# Patient Record
Sex: FEMALE | Race: WHITE
[De-identification: names, ages, dates, MRNs, and addresses within clinical notes are randomized per-mention and may not be internally consistent; named-entity substitution may affect disease eponyms.]

---

## 2017-02-01 ENCOUNTER — HOSPITAL ENCOUNTER (EMERGENCY)
Dept: HOSPITAL 80 - CED | Age: 22
Discharge: HOME | End: 2017-02-01
Payer: MEDICAID

## 2017-02-01 VITALS — RESPIRATION RATE: 18 BRPM | OXYGEN SATURATION: 97 %

## 2017-02-01 VITALS — TEMPERATURE: 98.6 F | DIASTOLIC BLOOD PRESSURE: 64 MMHG | SYSTOLIC BLOOD PRESSURE: 111 MMHG | HEART RATE: 81 BPM

## 2017-02-01 DIAGNOSIS — Z87.891: ICD-10-CM

## 2017-02-01 DIAGNOSIS — J02.9: Primary | ICD-10-CM

## 2017-02-01 NOTE — UCPHY
H & P


Time Seen by Provider: 02/01/17 14:41


Patient Type: Established


HPI/ROS: 


CHIEF COMPLAINT:  Sore throat





HISTORY OF PRESENT ILLNESS:  22-year-old female presents to Urgent Care by 

private vehicle complaining of sore throat.  Patient denies dysphagia.  She has 

had sore throat over last few days.  She does have a history of strep throat in 

the past.  Her younger sister was diagnosed with strep recently whom she is 

around a lot.  She denies nasal congestion, rhinorrhea or cough.  Denies rash.  

Denies abdominal pain, vomiting or diarrhea.





REVIEW OF SYSTEMS:


Constitutional:  No fever, no chills.


Eyes:  No double or blurry vision.


ENT: sore throat.


Respiratory:  No cough, no shortness of breath.


Cardiac:  No chest pain.


Gastrointestinal:  No abdominal pain, vomiting or diarrhea.


Genitourinary:  No dysuria.


Musculoskeletal:  No neck or back pain.


Skin:  No rashes.


Neurological:  No headache.


Past Medical/Surgical History: 


Negative


Social History: 


Single and lives in [place]


Smoking Status: Former smoker


Physical Exam: 


General Appearance:  Alert, no distress. Temperature 36.7.


Eyes:  Pupils equal and round.  Extraocular motions are all intact.


ENT:  Mouth:  Mucous membranes moist. Mild posterior pharyngeal injection.  No 

exudate.  No muffled voice or trismus.  No anterior cervical lymphadenopathy 

palpated.


Respiratory:  No wheezing, rhonchi, or rales, lungs are clear to auscultation.


Cardiovascular:  Regular rate and rhythm.


Gastrointestinal:  Abdomen is soft and nontender, no masses, no rebound or 

guarding, bowel sounds normal.


Neurological:  Alert and oriented x 3, cranial nerves II through XII grossly 

intact


Skin:  Warm and dry, no rashes.


Musculoskeletal:  Nontender to palpate along the cervical, thoracic or lumbar 

spine.  Neck is supple.


Extremities:  Full range of motion and no peripheral edema.


Psychiatric:  Patient is oriented X 3, there is no agitation.


Constitutional: 


 Initial Vital Signs











Temperature (C)  36.7 C   02/01/17 14:30


 


Heart Rate  87   02/01/17 14:30


 


Respiratory Rate  18   02/01/17 14:30


 


Blood Pressure  118/73   02/01/17 14:30


 


O2 Sat (%)  97   02/01/17 14:30








 











O2 Delivery Mode               Room Air














Allergies/Adverse Reactions: 


 





Penicillins Allergy (Verified 02/01/17 14:43)


 


succinylcholine Allergy (Verified 02/01/17 14:43)


 








Home Medications: 














 Medication  Instructions  Recorded


 


Hydrocodone/APAP 5/325 [Norco 1 - 2 tab PO Q4 PRN #30 tab 08/27/16





5/325 (*)]  


 


Ibuprofen [Motrin (*)] 600 mg PO Q6 PRN #60 tab 08/27/16


 


Iron Polysacch/Iron Heme Polyp 28 mg PO DAILY #30 tab 08/27/16





[Bifera]  














Medical Decision Making


ED Course/Re-evaluation: 


22-year-old female presents to urgent care with concerns of possible strep 

throat.  Strep swab is pending.





Rapid strep test was negative. The patient will call for the results of the 

throat culture in 48 hours.  She was encouraged to return if she had any other 

change in symptoms or if she felt worse in any way.


Differential Diagnosis: 


Including but not limited to strep pharyngitis, mononucleosis, viral syndrome, 

influenza, bronchitis, pneumonia, retropharyngeal abscess





- Data Points


Laboratory Results: 


 











  02/01/17 02/01/17





  Unknown 15:19


 


Group A Strep Screen    NEGATIVE 





    (NEGATIVE) 


 


Group A Strep DNA  Pending   





   














Departure





- Departure


Disposition: Home, Routine, Self-Care


Clinical Impression: 


Pharyngitis


Qualifiers:


 Pharyngitis/tonsillitis etiology: unspecified etiology Qualifier Code: (J02.9) 

Acute pharyngitis, unspecified


Condition: Good


Instructions:  Pharyngitis (ED)


Additional Instructions: 


Adult Pain & Fever Control:


We recommend Acetaminophen (Tylenol) and Ibuprofen (Motrin,Advil) for pain and 

fever control.  When fever is high or pain severe, both drugs can be used at 

the same time, but at different intervals.  Please note the time differences.  


Your dose is:     Acetaminophen 1000mg every 4 to 6 hours


        Ibuprofen 600mg every 8 hours with food  


         Note:  do not take Acetaminophen with Hydrocodone (Vicodin, Lortab) or 

Oycodone (Percocet).  These medications also contain Acetaminophen.  No more 

than 3000mg of Acetaminophen should be taken in 24 hours (for an adult).





Call [phone] for the results of your throat culture in 48 hours.


Referrals: 


[facility] [Outside] - As per Instructions





- PQRS


PQRS Measurement: 


Not applicable

## 2017-10-10 ENCOUNTER — HOSPITAL ENCOUNTER (EMERGENCY)
Dept: HOSPITAL 80 - CED | Age: 22
Discharge: HOME | End: 2017-10-10
Payer: MEDICAID

## 2017-10-10 VITALS
DIASTOLIC BLOOD PRESSURE: 99 MMHG | TEMPERATURE: 98.4 F | SYSTOLIC BLOOD PRESSURE: 136 MMHG | HEART RATE: 95 BPM | RESPIRATION RATE: 18 BRPM | OXYGEN SATURATION: 97 %
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
## 2017-10-10 DIAGNOSIS — J45.909: ICD-10-CM

## 2017-10-10 DIAGNOSIS — Z87.891: ICD-10-CM

## 2017-10-10 DIAGNOSIS — S61.212A: Primary | ICD-10-CM
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
## 2017-10-10 NOTE — EDPHY
H & P


Time Seen by Provider: 10/10/17 12:38


HPI/ROS: 





CHIEF COMPLAINT:  Finger laceration





HISTORY OF PRESENT ILLNESS:  The patient is a 22-year-old female who comes to 

the emergency department complaining of a laceration to the dorsal aspect of 

her right middle finger.  She was using a mandolin at home when she sliced her 

finger.  This happened about 30 minutes ago.  The bleeding has now stopped.





REVIEW OF SYSTEMS:


Constitutional:  denies: chills, fever, recent illness, recent injury


EENTM: denies: blurred vision, double vision, nose congestion


Respiratory: denies: cough, shortness of breath


Cardiac: denies: chest pain, irregular heart rate, lightheadedness, palpitations


Gastrointestinal/Abdominal: denies: abdominal pain, diarrhea, nausea, vomiting, 

blood streaked stools


Genitourinary: denies: dysuria, frequency, hematuria, pain


Musculoskeletal: denies: joint pain, muscle pain


Skin:  See HPI


Neurological: denies: headache, numbness, paresthesia, tingling, dizziness, 

weakness


Hematologic/Lymphatic: denies: blood clots, easy bleeding, easy bruising


Immunologic/allergic: denies: HIV/AIDS, transplant








EXAM:


GENERAL:  Well-appearing, well-nourished and in no acute distress.


HEAD:  Atraumatic, normocephalic.


EYES:  Pupils equal round and reactive to light, extraocular movements intact, 

sclera anicteric, conjunctiva are normal.


ENT:  TMs normal, nares patent, oropharynx clear without exudates.  Moist 

mucous membranes.


NECK:  Normal range of motion, supple without lymphadenopathy or JVD.


LUNGS:  Breath sounds clear to auscultation bilaterally and equal.  No wheezes 

rales or rhonchi.


HEART:  Regular rate and rhythm without murmurs, rubs or gallops.


ABDOMEN:  Soft, nontender, normoactive bowel sounds.  No guarding, no rebound.  

No masses appreciated.


BACK:  No CVA tenderness, no spinal tenderness, step-offs or deformities


EXTREMITIES:  Normal range of motion, no pitting or edema.  No clubbing or 

cyanosis.


NEUROLOGICAL:  Cranial nerves II through XII grossly intact.  Normal speech, 

normal gait.  5/5 strength, normal movement in all extremities, normal sensation


PSYCH:  Normal mood, normal affect.


SKIN:  Avulsion laceration to right middle finger over the middle phalanx.  No 

joint involvement.  To 3 mm deep.  Bleeding controlled.  No remaining tissue 

available for repair.








Source: Patient


Exam Limitations: No limitations





- Personal History


Current Tetanus/Diphtheria Vaccine: Yes


Tetanus Vaccine Date: 2016





- Medical/Surgical History


Hx Asthma: Yes


Hx Chronic Respiratory Disease: No


Hx Diabetes: No


Hx Cardiac Disease: No


Hx Renal Disease: No


Hx Cirrhosis: No


Hx Alcoholism: No


Hx HIV/AIDS: No


Hx Splenectomy or Spleen Trauma: No


Other PMH: asthma.  surg-none





- Family History


Significant Family History: No pertinent family hx





- Social History


Smoking Status: Former smoker


Alcohol Use: Sober


Drug Use: None


Constitutional: 


 Initial Vital Signs











Temperature (C)  36.9 C   10/10/17 12:44


 


Heart Rate  95   10/10/17 12:44


 


Respiratory Rate  18   10/10/17 12:44


 


Blood Pressure  136/99 H  10/10/17 12:44


 


O2 Sat (%)  97   10/10/17 12:44








 











O2 Delivery Mode               Room Air














Allergies/Adverse Reactions: 


 





Penicillins Allergy (Verified 10/10/17 12:43)


 


succinylcholine Allergy (Verified 10/10/17 12:43)


 








Home Medications: 














 Medication  Instructions  Recorded


 


NK [No Known Home Meds]  10/10/17














Medical Decision Making


ED Course/Re-evaluation: 





The patient's laceration is not amenable to suturing.  It was cleaned and then 

dressed with Surgicel.  We discussed wound care treatment.


Differential Diagnosis: 





Partial list of the Differential diagnosis considered include but were not 

limited to;  laceration, avulsion and although unlikely based on the history 

and physical exam, I also considered foreign body, bone or joint injury .  I 

discussed these differential diagnoses and the plan with the patient as well as 

the usual and expected course.  The patient understands that the diagnosis is 

provisional and that in medicine we are not always correct and that further 

workup is often warranted.  Usual and customary warnings were given.  All of 

the patient's questions were answered.  The patient was instructed to return to 

the emergency department should the symptoms at all worsen or return, otherwise 

to followup with the physician as we discussed.





Departure





- Departure


Disposition: Home, Routine, Self-Care


Clinical Impression: 


Laceration of right middle finger


Qualifiers:


 Encounter type: initial encounter Damage to nail status: without damage 

Foreign body presence: without foreign body Qualified Code(s): S61.212A - 

Laceration without foreign body of right middle finger without damage to nail, 

initial encounter





Condition: Fair


Instructions:  Laceration (ED)


Referrals: 


NONE *PRIMARY CARE P,. [Primary Care Provider] - 3-4 days, if not improved

## 2018-06-05 ENCOUNTER — HOSPITAL ENCOUNTER (OUTPATIENT)
Dept: HOSPITAL 80 - FIMAGING | Age: 23
End: 2018-06-05
Attending: NURSE PRACTITIONER
Payer: MEDICAID

## 2018-06-05 DIAGNOSIS — O09.212: ICD-10-CM

## 2018-06-05 DIAGNOSIS — O44.22: Primary | ICD-10-CM

## 2018-06-05 DIAGNOSIS — O26.892: ICD-10-CM

## 2018-06-05 DIAGNOSIS — N85.6: ICD-10-CM

## 2018-06-05 DIAGNOSIS — Z3A.22: ICD-10-CM

## 2018-08-28 ENCOUNTER — HOSPITAL ENCOUNTER (OUTPATIENT)
Dept: HOSPITAL 80 - FIMAGING | Age: 23
End: 2018-08-28
Attending: OBSTETRICS & GYNECOLOGY
Payer: MEDICAID

## 2018-08-28 DIAGNOSIS — O44.43: Primary | ICD-10-CM

## 2018-08-28 DIAGNOSIS — Z3A.34: ICD-10-CM

## 2018-09-08 ENCOUNTER — HOSPITAL ENCOUNTER (INPATIENT)
Dept: HOSPITAL 80 - FLD | Age: 23
LOS: 1 days | Discharge: HOME | DRG: 565 | End: 2018-09-09
Attending: OBSTETRICS & GYNECOLOGY | Admitting: OBSTETRICS & GYNECOLOGY
Payer: MEDICAID

## 2018-09-08 DIAGNOSIS — Z3A.35: ICD-10-CM

## 2018-09-08 DIAGNOSIS — O09.212: ICD-10-CM

## 2018-09-08 DIAGNOSIS — O47.03: Primary | ICD-10-CM

## 2018-09-08 DIAGNOSIS — B00.9: ICD-10-CM

## 2018-09-08 DIAGNOSIS — O98.513: ICD-10-CM

## 2018-09-08 LAB — PLATELET # BLD: 263 10^3/UL (ref 150–400)

## 2018-09-08 NOTE — GHP
DATE OF ADMISSION:  2018



ADMITTING DIAGNOSES:  

1.  Intrauterine pregnancy at 35 weeks and 4 days.

2.   contractions.

3.  History of  delivery x2.



HISTORY OF PRESENT ILLNESS:  Patient is a 23-year-old, G3, para 0-2-0-2 at 35 
weeks 4 days with estimated due date 10/09/2018, by LMP 2018 and 
consistent with first trimester ultrasound at 8 weeks 6 days.  Patient presents 
to Labor and Delivery with complaints of contractions starting around 1:30 this 
morning. They have been progressively getting closer and now every 3-5 minutes 
apart.  [place] [facility] [place] was not told they do not 
deliver at [facility] anymore.  Patient states contractions are uncomfortable, pain is 
a 6/10.  Patient denies any leakage of fluid or vaginal bleeding.  She denies 
any abnormal discharge, foul odor, or itch.  Patient denies any dysuria, or 
cloudy, foul-smelling urine.  Patient states good fetal movement noted, they 
are having a girl.  Patient has good prenatal care at [facility] 
and presented in her first trimester.  Pregnancy is complicated by history of 
 labor and delivery with previous pregnancies x2 at 36 weeks. Patient 
declined vaginal/IM progesterone. Patient had a low-lying placenta on anatomy 
scan that resolved on ultrasound at 33 weeks 4 days per [facility].  Patient has 
history of asthma, as well as anxiety, not taking any medications currently.  
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 
started on prophylaxis with Acyclovir, but this medication makes her nauseous.  
Patient did receive Tdap. GBS culture is negative.



PAST OB HISTORY:  In May 2015, she had a vacuum assisted vaginal delivery at 36 
weeks 5 days, a viable female infant.  In 2016, she had a normal 
spontaneous vaginal delivery at 36 weeks 1 day, a viable male infant.



PAST GYN HISTORY:  Age of menarche is 13.  Cycles are regular, every 28 days 
for 5 days.  Patient denies a history of abnormal Pap smears.  She did not have 
a Pap smear in this pregnancy.  Patient has HSV type 1, but denies exposure to 
any other STDs.  GC and chlamydia cultures were negative in this pregnancy.



CURRENT MEDICATIONS:  Include prenatal vitamins, DHA, and acyclovir.



ALLERGIES:  Penicillins, succinylcholine.



MEDICAL HISTORY:  Remarkable for asthma and anxiety.



PAST SURGICAL HISTORY:  Unremarkable.



FAMILY HISTORY:  Unremarkable.



SOCIAL HISTORY:  Patient is .  She lives with her  and their 
daughter and son.  They live in [place].  She denies any alcohol, tobacco, or 
illicit drug use during this pregnancy.



REVIEW OF SYSTEMS:  10-point review of systems is negative.  Pertinent 
positives noted in HPI.



PRENATAL LABS:  A-positive, antibody negative.  First trimester H and H, 14.1 
and 40.3.  Varicella is immune.  Rubella immune.  RPR nonreactive.  Urine 
culture negative.  HIV negative.  Hepatitis B surface antigen negative.  GC and 
chlamydia cultures negative.  TSH 1.12.  1 hour Glucola 88.  H and H in third 
trimester, 12.5 and 36.6.  GBS culture is negative.



LABORATORY:  WBC 9.31. H and H, 12 and 35.6. Clean-catch UA reveals trace leuks
, 5-10 WBCs and trace bacteria.



EXAM:  On admission: VITAL SIGNS:  Stable.  Patient is afebrile at 37.4, heart 
rate 74, respirations 16, blood pressure 117/76.  GENERAL:  Well-nourished, well
-developed female, alert and oriented x3.  No apparent distress.  SKIN:  Warm, 
dry without rash.  NEURO:  Grossly intact.  CARDIOVASCULAR:  Regular rate and 
rhythm.  LUNGS:  Clear to auscultation bilaterally.  ABDOMEN:  Gravid, soft, 
nontender.  PELVIC:  Initially was found to be 3-4 cm dilated, 50% effaced, -2 
station, and intact.  EXTREMITIES:  Normal to inspection without calf 
tenderness or edema.



Fetal heart tones:  Category 1 strip.  Baseline 140 beats per minute.  Positive 
accelerations.  No decelerations.  Moderate variability.  On [place], she is 
daniel every 3-5 minutes.



ASSESSMENT/PLAN:  Patient is a 23-year-old G3, Para 0-2-0-2 with  
contractions and a history of  delivery x2



1.  Admit to Labor and Delivery for observation.

2.  [place] [facility] [place].

3.  Patient was re-examined about 2 hours after admission around 0530, and 
found to be 3-4 cm by Rn-cervix unchanged. Patient received 2 L of fluids and 
first dose of steroids at 0500 this am.

4.  On my exam, now 6 hours after admission, there has been no cervical change 
despite contractions every 3-4 minutes.

5.  GBS culture is negative, so no prophylactic antibiotics are needed if 
patient goes into labor.

6.  Fetal heart tones are Category 1 tracing, reassuring.

7.  Will send urine for C&S.

8.  Patient was given options of staying here in the hospital for continued 
observation until second dose of steroids due at 0500 hours in the morning 
versus being discharged home and then follow up in [place] with her OB 
physician for second dose of steroids. Patient desires to deliver at [facility] and 
wants to stay here for continued observation secondary to a 45-minute drive 
from [facility] [place] and her history of  delivery x 2.

9.  Plan continue close observation at this time and monitor for any cervical 
change.





Job #:  [id number]

MTDD

## 2018-09-09 NOTE — OBPROG
Labor Progress Note


Assessment/Plan: 


Assessment:


24 yo  at 35w5d here for threatened  labor, dilated to 3-4cm, 

with history of PTD at 36 wks x 2.





Now through 2 doses of BMZ as of 0500 this AM. 


Still daniel irregularly, but improved in frequency and intensity from 

admission.


Intact, little to no bleeding.





Had a long discusssion re continued observation here inpatient vs discharge 

home with f/u as OP.


She'd prefer to go home.  Precautions reviewed for returning to L&D.


She would like to transfer her care to [facility] and delivery here - [facility] records 

faxed to our office and New OB appointment set up for 18 with Dr. Parada.





JM


Subjective/Intrapartum Course: 


Blanka is feeling better this AM - still feeling some ctx's, but in general 

they're less painful and less frequent.  Would be fine with going home, did get 

rechecked at 0500 this AM and again unchanged from admission.


Objective: 





 





 18 10:40 





 











Patient ABO/Rh  A POSITIVE   18  10:40    














- SVE


Dilation (cm): 3


Effacement (%): 50


Station: -2


Membranes: Intact





- Contraction Pattern Assessment


Current Contraction Pattern: Irregular





- FHR Assessment


  ** Fritz


FHR (bpm): 135


FHR Pattern Variability: Moderate


FHR Category: 1





Oxytocin Orders Assessment





- Pre-Induction/Augmentation Assessment


Gestational Age: 35 week(s) and 4 day(s)





ICD10 Worksheet


Patient Problems: 


 Problems











Problem Status Onset


 


H/O  delivery, currently pregnant Acute  


 


 contractions Acute  


 


Vaginal delivery Acute

## 2018-09-11 ENCOUNTER — HOSPITAL ENCOUNTER (OUTPATIENT)
Dept: HOSPITAL 80 - FLD | Age: 23
Setting detail: OBSERVATION
Discharge: HOME | End: 2018-09-11
Attending: OBSTETRICS & GYNECOLOGY | Admitting: OBSTETRICS & GYNECOLOGY
Payer: MEDICAID

## 2018-09-11 DIAGNOSIS — E27.9: ICD-10-CM

## 2018-09-11 DIAGNOSIS — O09.213: ICD-10-CM

## 2018-09-11 DIAGNOSIS — O99.283: ICD-10-CM

## 2018-09-11 DIAGNOSIS — Z3A.36: ICD-10-CM

## 2018-09-11 DIAGNOSIS — O46.93: Primary | ICD-10-CM

## 2018-09-11 LAB — PLATELET # BLD: 250 10^3/UL (ref 150–400)

## 2018-09-11 PROCEDURE — G0378 HOSPITAL OBSERVATION PER HR: HCPCS

## 2018-09-11 PROCEDURE — 76815 OB US LIMITED FETUS(S): CPT

## 2018-09-11 PROCEDURE — 59025 FETAL NON-STRESS TEST: CPT

## 2018-09-11 NOTE — OBPROG
Labor Progress Note


Assessment/Plan: 


Assessment:








24 y/o  @ 36 weeks with second admission for PTCs (unchanged cervix), 

now with VB

















Plan:





Pt is back from [facility]-spoke with Dr. Schilling that states no placental bleed is 

noted, but does note calcifications; she does see a ? small L adrenal bleed 

that does not affect antepartum management or timing of delivery, but will need 

to be followed up by peds and an u/s


Pt is stable, states ctx's have spaced out and no further bleeding is noted


Declining antiemetics for nausea


FHTs - Cat I tracing, reactive


Plan for discharge home with PTL and bleeding precautions


Pt to make an appointment at [facility] in 1 week








18 14:01





Subjective/Intrapartum Course: 





18 13:58


Pt is back in her room and feels nauseated. She is "ready to have this baby." 

Pt states ctx's have spaced out and has similar discomfort with her ctx's. Low 

back pain is slightly improved after k-pad. Good FM noted. Denies any further 

spotting or VB and no LOF.





Objective: 





 





 18 04:15 





 











Patient ABO/Rh  A POSITIVE   18  04:15    














- Contraction Pattern Assessment


Current Contraction Pattern: Irregular (q2-6 min)





- FHR Assessment


  ** Fritz


FHR (bpm): 140


FHR Pattern Variability: Moderate


FHR Category: 1





- AP


Antepartum Course: 





18 14:02


PNC with [facility]. IUP @ 36 weeks with previous PTD at 35 and 36 weeks. GBS neg. 

Marginal previa resolved on u/s at 33 4/7 wks. 





Oxytocin Orders Assessment





- Pre-Induction/Augmentation Assessment


Gestational Age: 36 week(s) and 0 day(s)





ICD10 Worksheet


Patient Problems: 


 Problems











Problem Status Onset


 


H/O  delivery, currently pregnant Acute  


 


 contractions Acute  


 


Vaginal delivery Acute

## 2018-09-11 NOTE — GHP
DATE OF ADMISSION:  2018



ADMITTING DIAGNOSES:  

1.  Intrauterine pregnancy at 36 weeks.

2.  Vaginal bleeding.

3.   contractions with unchanged cervix.



HISTORY OF PRESENT ILLNESS:  The patient is a 23-year-old  3, para 0-2-0-
2 at 36 weeks, estimated due date 10/09/2018 by LMP 2018, consistent with 
first trimester ultrasound 8 weeks 6 days.  The patient presents to Labor and 
Delivery this morning with complaints of bright red vaginal bleeding at 2 a.m.  
The patient states it woke her up out of bed, when she noticed a gush of fluid.
  She was excited, thinking it was amniotic fluid and looked down and was 
concerned because it was bright red blood. She passed a large clot while here 
on L&D.  The patient states contractions are about the same, every 3-5 minutes, 
not anymore painful then they have been.  Denies any other abdominal pain.  She 
does endorse some low back pain that is new.  The patient states there is good 
fetal movement.  Patient gets her prenatal care at [facility] but 
wants to transfer here to [facility].  She has an appointment today at [facility] at 11:00 for a new OB.  Pregnancy complicated by history of  labor/
delivery with previous pregnancies x2 at 35 and 36 weeks.  Patient declined 
vaginal/IM progesterone with this pregnancy.  The patient had a low-lying 
placenta on anatomy scan that resolved on ultrasound at 33 weeks 4 days per 
MFM.  The patient has a history of anxiety, not taking any medications 
currently.  The patient had blood work done showing positive HSV type 1, but 
patient denies any oral or genital lesions and was recently started on 
prophylaxis with acyclovir, but this medication makes her nauseous.  The 
patient received Tdap. GBS culture is negative.



PAST OB HISTORY:  In May 2015, she had a vaginal delivery at 35 weeks 5 days, a 
viable male infant.  In 2016, she had a normal spontaneous vaginal 
delivery at 36 weeks 1 day, a viable female infant.



PAST GYN HISTORY:  Age of menarche is 13.  Cycles are regular, every 28 days 
for 5 days.  Patient denies a history of abnormal Pap smears.  She did not have 
a Pap smear in this pregnancy.  The patient had blood work showing positive 
herpes simplex virus type 1, but denies any oral or genital lesions.  Denies 
exposure to any other STDs. GC and chlamydia cultures were negative in this 
pregnancy.



CURRENT MEDICATIONS:  Include prenatal vitamins, DHA.



ALLERGIES:  Penicillins, succinylcholine.



MEDICAL HISTORY:  Remarkable for asthma, anxiety.



PAST SURGICAL HISTORY:  Unremarkable.



FAMILY HISTORY:  Unremarkable.



SOCIAL HISTORY:  Patient is .  She lives with her  and their 
daughter and son.  They live in [place] [facility].  She denies any alcohol, tobacco, or 
illicit drug use currently during this pregnancy.



REVIEW OF SYSTEMS:  Ten-point review of systems is negative.  Pertinent 
positives noted in HPI.



PRENATAL LABS:  Again, A-positive, antibody negative.  First trimester H and H 
14.1, 40.3.  Varicella is immune.  Rubella immune.  RPR nonreactive.  Urine 
culture negative.  HIV negative.  Hepatitis B surface antigen negative.  GC/
chlamydia cultures negative.  TSH 1.12.  One-hour Glucola 88.  H and H in 3rd 
trimester 12.5, 36.6.  GBS culture is negative.  Here today, white blood cell 
count 10.65, H and H 11.1, 32.2.



PHYSICAL EXAMINATION:  VITAL SIGNS:  Stable.  Patient is afebrile with a 
temperature of 36, heart rate 85, respirations 16, blood pressure 121/81.  
GENERAL:  Well-nourished, well-developed female, alert and oriented x3.  No 
apparent distress.  SKIN:  Warm, dry, without rash.  NEURO:  Grossly intact.  
CARDIOVASCULAR:  Regular rate and rhythm.  LUNGS:  Clear to auscultation.  
ABDOMEN:  Gravid, soft, nontender.  PELVIC:  On exam this morning, she was 
found to be unchanged, still 3-4 cm dilated, 60% effaced, and -3 station.  She 
was examined 2-3 hours apart upon admission.  Upon looking at her pad, she has 
some spotting on there.  No new bleeding since she has been in hospital. 
EXTREMITIES:  Normal to inspection, without calf tenderness or edema.  



Fetal heart tones: Category 1 strip.  Reactive with  fetal baseline 140 beats 
per minute. Positive accelerations.  No decelerations.  Moderate variability.  
Contractions are every 3-6 minutes.



ASSESSMENT/PLAN:  Patient is a 23-year-old, G3, para 0-2-0-2, now with vaginal 
bleeding and  contractions.



1.  Admit to Labor and Delivery for observation.

2.  Consult with MFM to rule out abruption or low-lying placenta.

3.  There has been no cervical changes noted over the last 72 hours. Doubt 
bleeding is due to bloody show or multiple exams. 

4.  Patient is Rh positive.

5.  Patient has an appointment today in our office at 11:00 for new OB visit.  
The patient will be taken to the office via wheelchair.  

6.  GBS culture is negative.  

7.  Fetal heart tones reveal Category 1 tracing, reactive and reassuring.  

8.  Continue to closely monitor for signs of  labor, and/or continued 
bleeding.





Job #:  [id number]

MTDD

## 2018-09-13 ENCOUNTER — HOSPITAL ENCOUNTER (INPATIENT)
Dept: HOSPITAL 80 - FLD | Age: 23
LOS: 2 days | Discharge: HOME | DRG: 560 | End: 2018-09-15
Attending: OBSTETRICS & GYNECOLOGY | Admitting: OBSTETRICS & GYNECOLOGY
Payer: MEDICAID

## 2018-09-13 DIAGNOSIS — Z3A.36: ICD-10-CM

## 2018-09-13 DIAGNOSIS — O46.93: ICD-10-CM

## 2018-09-13 LAB — PLATELET # BLD: 209 10^3/UL (ref 150–400)

## 2018-09-13 RX ADMIN — IBUPROFEN SCH MG: 600 TABLET ORAL at 18:18

## 2018-09-13 RX ADMIN — ACETAMINOPHEN SCH: 325 TABLET ORAL at 06:50

## 2018-09-13 RX ADMIN — ACETAMINOPHEN SCH: 325 TABLET ORAL at 18:11

## 2018-09-13 RX ADMIN — ACETAMINOPHEN SCH: 325 TABLET ORAL at 14:28

## 2018-09-13 RX ADMIN — DOCUSATE SODIUM PRN MG: 100 CAPSULE, LIQUID FILLED ORAL at 14:03

## 2018-09-13 RX ADMIN — IBUPROFEN SCH MG: 600 TABLET ORAL at 12:04

## 2018-09-13 NOTE — GHP
DATE OF ADMISSION:  2018



HISTORY UPON ADMISSION:  The patient is a 23-year-old, G 3, P 0-2-0-2, at 36 
weeks' and 2 days' by an estimated due date of 10/09/2018, by last menstrual 
period, consistent with an 8+ week ultrasound.  The patient reports spontaneous 
onset of more painful contractions approximately midnight.  The patient reports 
increased vaginal bleeding approximately 2 a.m.  The patient was having good 
fetal movement, and upon admission, initial exam at approximately 4 o'clock, 
the patient was 8 cm dilated, 70% to 80% effaced, at -1 station.  Bag of water 
was still intact.  The patient had felt bleeding that was bright red and 
moderately heavy.  The patient has had increased issues since , at 
which time she was having more contractions and the onset of vaginal bleeding.  
The patient has been evaluated several times on labor and delivery and has been 
3-4 cm dilated, 70% effaced.  She did not have progression and contractions did 
not increase.  The bleeding was stable.  The patient had an ultrasound with the 
specialist on 2018 at which time, they did not see any obvious signs of 
abruption or previa.  The placenta was felt to be low lying earlier in pregnancy
, but that had resolved at 33 weeks' per MFM.  Again, the cervix had not 
changed from 3-4 cm on , and the patient was discharged home again.



PAST MEDICAL HISTORY:  Asthma since childhood.  The patient uses an inhaler 
occasionally, been symptomatic and used it with bronchitis earlier this year.  
History of anxiety.



PAST SURGICAL HISTORY:  Unremarkable.



PAST OBSTETRIC HISTORY:  In May 2015, a vacuum-assisted vaginal delivery at 35 
weeks', 5 days' with a viable male.  In 2016, spontaneous vaginal 
delivery at 36 weeks' and 1 day of a viable female.  Due to the  
delivery with her 1st, the patient was advised and took progesterone during her 
2nd pregnancy, but did not have any appreciable change in delivery timing and 
the patient was advised to do progesterone again this pregnancy, but declined.



PAST GYN HISTORY:  Menarche at the age of 13.  Denies history of abnormal Pap 
smears.  Did not have a Pap smear during this pregnancy.  History of HSV, type 1
, only diagnosed through lab testing.  The patient has never had vaginal or 
vulvar lesions or cold sores.  Denies other STDs.



CURRENT PREGNANCY HISTORY:  The patient has been with [facility] for 
her prior pregnancies and throughout this pregnancy, but had not been advised 
that [facility] was not delivering at this hospital.  When the patient 
became aware of this, she transferred care to [facility] on .  The patient had compliant prenatal care.  Of note, on the [facility] u/s on  
they felt concern for possible adrenal mass.  needs f/u



PRENATAL LABS:  Maternal blood type A positive with negative antibody screen.  
RPR nonreactive.  Rubella immune.  Hepatitis B surface antigen negative.  HIV 
negative.  Initial H and H were 14 and 40, with followup later in pregnancy, 
12.5 and 36.  Varicella is immune.  Urine culture negative.  Gonorrhea and 
chlamydia negative.  No Pap smear performed.  TSH normal.  One-hour Glucola 
normal.  GBS culture was negative.



SOCIAL HISTORY:  The patient is  and stays at home with the kids.  She 
lives with her  and their other 2 children.  The patient is a nonsmoker.
  No alcohol, marijuana, or other drug use.



CURRENT MEDICATIONS:  Prenatal vitamins, DHA.



ALLERGIES:  Penicillin and succinylcholine.



PHYSICAL EXAMINATION:  GENERAL:  Upon admission, the patient is a well-developed
, well-nourished, white female in moderate-to-intense distress with 
contractions.  The patient reports nausea, but no emesis.  VITAL SIGNS:  
Initial blood pressure is 80s over 60s, up to 100 over 70s.  The patient is 
afebrile.  Fetal heart tones reveal a category 1 tracing, with baseline in the 
140s to 150s with moderate variability and accelerations.  No decelerations 
noted.  Contractions approximately every 2-3 minutes.  PELVIC:  The patient has 
a moderate amount of blood on her inner thighs and vaginally, having moderate-to
-heavy bloody show.  Initial exam per RN was 8 cm dilated, and upon my arrival 
at the hospital approximately 4:25, the patient was 9 cm, 100% effaced, at -1 
station.  Bulging bag of water.  EXTREMITIES:  Nontender and no edema.



ASSESSMENT:  Intrauterine pregnancy at 36 weeks' and 2 days' with active labor.
  History of  deliveries x2.  GBS negative.  Of note, the ultrasound 
performed on 2018, by Dr. Schilling of [facility] and Fetal Medicine 
Department revealed a question of an adrenal mass on the baby.  This is to be 
followed up through the pediatrician.



PLAN:  Expect vaginal delivery eminently.





Job #:  [id number]

MTDD

## 2018-09-13 NOTE — OBPP
PostPartum Progress Note


Assessment/Plan: 


Assessment:


24 y/o PPD #0 s/p  doing well























Plan:


Lactation support and routine PPC.  


18 11:37





Subjective/Postpartum Course: 





18 11:34


Pt is doing well today.  She has min cramping controlled with Ibuprofen.  She 

is ambulating and voiding and has min lochia.  Breast feeding is going well and 

baby is good.  


Objective: 





 





 18 04:30 





 











Patient ABO/Rh  A POSITIVE   18  04:30    








 











Temp Pulse Resp BP Pulse Ox


 


 37.0 C   96   20   92/58 L   


 


 18 08:14  18 08:14  18 08:14  18 08:14   











Uterine Position/Fundal Height: Umbilicus -3


Uterine Tone: Firm





PostPartum Physical Exam





- Physical Exam


General Appearance: alert, no apparent distress


Neck: non-tender, full range of motion, supple


Respiratory: chest non-tender, lungs clear, normal breath sounds


Cardiac/Chest: regular rate, rhythm


Abdomen: normal bowel sounds


Extremities: swelling (no), Frantz's sign (neg)

## 2018-09-13 NOTE — OBDEL
Birth Info


Birth Type: Vaginal


Presentation at Delivery: Vertex


L&D Analgesia/Anesthesia Type: Nitrous


GBS+: No


Intrapartum Medications: 





 














Discontinued Medications














Generic Name Dose Route Start Last Admin





  Trade Name Lisette  PRN Reason Stop Dose Admin


 


Ibuprofen  600 mg  18 04:05  18 05:29





  Motrin  PO   600 mg





  ONCE PRN   Administration





  post partum, pain   











Indications for Delivery: Spontaneous Labor





Vaginal Delivery





- Delivery Provider


Delivery Physician/CNM: Evi Parada





- Labor and Delivery


Onset of Contractions Date: 18


Onset of Contractions Time: 00:01


Onset of Contractions Type: Spontaneous


Rupture of Membranes Date: 18


Rupture of Membranes Time: 04:50


Rupture of Membranes Type: Spontaneous


Amniotic Fluid Color: Bloody


Dilation Complete Date: 18


Dilation Complete Time: 04:51


Placenta Delivery Date: 18


Placenta Delivery Time: 05:01


Total Hours of Labor: 5


Laceration: Other (Specify) (none)


Vaginal Sponge Count Correct: Yes


Vaginal Needle Count Correct: Yes


Vaginal Sweep Performed: Yes


EBL: 400


Delivery Events: Other (Specify) (heavier BRB with admit than normal bloody show

)





- Medications


Labor Augmentation/Induction Methods Used: None





[place] Birth Data


NICOLE: 10[birth year]


Gestational Age: 36 week(s) and 2 day(s)


  ** Fritz


Delivery Date: 18


Delivery Time: 04:57


Sex of Infant: Male


Apgar Score (1 Min): 9


Apgar Score (5 Min): 10





ICD10 Worksheet


Patient Problems: 


 Problems











Problem Status Onset


 


Placental abruption Acute  


 


 (spontaneous vaginal delivery) Acute

## 2018-09-13 NOTE — GHP
DATE OF ADMISSION:  2018



HISTORY UPON ADMISSION:  See prior admission on 2018.  Patient is a 23-year-old, G3, P 0-2-0-2,
 now at 36 weeks and 2 days with an estimated due date of 10/09/2018 by last menstrual period of 2018 and also consistent with an 8 week 6 day ultrasound.  The patient has had  contractions
 and vaginal bleeding since .  She has been evaluated thoroughly over the last several day
s and did not 



DICTATION ENDED AT THIS POINT





Job #:  [id number]

## 2018-09-14 RX ADMIN — Medication SCH MG: at 19:33

## 2018-09-14 RX ADMIN — IBUPROFEN SCH MG: 600 TABLET ORAL at 00:42

## 2018-09-14 RX ADMIN — IBUPROFEN SCH MG: 600 TABLET ORAL at 18:30

## 2018-09-14 RX ADMIN — IBUPROFEN SCH MG: 600 TABLET ORAL at 12:24

## 2018-09-14 RX ADMIN — DOCUSATE SODIUM PRN MG: 100 CAPSULE, LIQUID FILLED ORAL at 12:24

## 2018-09-14 RX ADMIN — ACETAMINOPHEN SCH: 325 TABLET ORAL at 13:26

## 2018-09-14 RX ADMIN — ACETAMINOPHEN SCH: 325 TABLET ORAL at 06:05

## 2018-09-14 RX ADMIN — IBUPROFEN SCH MG: 600 TABLET ORAL at 05:55

## 2018-09-14 RX ADMIN — ACETAMINOPHEN SCH MG: 325 TABLET ORAL at 18:31

## 2018-09-14 RX ADMIN — ACETAMINOPHEN SCH: 325 TABLET ORAL at 13:23

## 2018-09-14 NOTE — OBPP
PostPartum Progress Note


Assessment/Plan: 


Assessment:22 yo  PPD#1 s/p  at 36w2d, anemia, otherwise doing well





Plan: Continue routine pp cares, start po iron supplement. 


[facility]ith[facility] Walker [facility], [facility]


[facility]





18 18:57











Subjective/Postpartum Course: 





18 11:34


Pt is doing well today.  She has min cramping controlled with Ibuprofen.  She 

is ambulating and voiding and has min lochia.  Breast feeding is going well and 

baby is good.  


18 19:06


Pt doing well.  Ambulating, voiding, kandis reg diet and breastfeeding without 

difficulty.  Min to mod lochia. Discomfort controlled with po ibuprofen and 

tylenol. 


Objective: 





 





 18 05:55 





 











Patient ABO/Rh  A POSITIVE   18  04:30    








 











Temp Pulse Resp BP Pulse Ox


 


 36.7 C   84   16   113/72    


 


 18 08:00  18 08:00  18 08:00  18 08:00   








Gen - pleasant female, NAD


CV - RRR


chest - CTAB


abd - soft, NT, fundus firm at u-3, + BS


ext - calves NT, trace edema








Uterine Position/Fundal Height: Umbilicus -3


Uterine Tone: Firm

## 2018-09-15 VITALS — SYSTOLIC BLOOD PRESSURE: 102 MMHG | DIASTOLIC BLOOD PRESSURE: 73 MMHG

## 2018-09-15 RX ADMIN — IBUPROFEN SCH MG: 600 TABLET ORAL at 06:14

## 2018-09-15 RX ADMIN — ACETAMINOPHEN SCH MG: 325 TABLET ORAL at 06:14

## 2018-09-15 RX ADMIN — IBUPROFEN SCH MG: 600 TABLET ORAL at 00:59

## 2018-09-15 RX ADMIN — ACETAMINOPHEN SCH MG: 325 TABLET ORAL at 00:59

## 2018-09-15 RX ADMIN — Medication SCH MG: at 10:36

## 2018-09-15 NOTE — OBGCSDC
General Delivery Information





- General Info


: 3


Para: 3


Abortions: 0


Birth Type: Vaginal


L&D Analgesia/Anesthesia Type: Nitrous


Admission Date: 18


Labs: 





 











Patient ABO/Rh  A POSITIVE   18  04:30    


 


Hct  28.0 % (38.0-47.0)  L  18  05:55    














- Hospital Course


Postpartum: 





18 11:34


Pt is doing well today.  She has min cramping controlled with Ibuprofen.  She 

is ambulating and voiding and has min lochia.  Breast feeding is going well and 

baby is good.  


18 19:06


Pt doing well.  Ambulating, voiding, kandis reg diet and breastfeeding without 

difficulty.  Min to mod lochia. Discomfort controlled with po ibuprofen and 

tylenol. 





Vaginal Birth





- Delivery Provider


Delivery Physician/CNM: Evi Parada





- Diagnosis


Labor: Spontaneous


Rupture of Membranes Type: Spontaneous


Amniotic Fluid Color: Bloody


Laceration: Other (Specify) (none)


Delivery Events: Other (Specify) (heavier BRB with admit than normal bloody show

)





 Birth





-  Delivery


EBL: 400





 Birth Data


NICOLE: [birth year]


Gestational Age: 36 week(s) and 4 day(s)


  ** Fritz


Delivery Date: 18


Delivery Time: 04:57


Sex of Infant: Female


 Weight (gm): 2816 g


Apgar Score (1 Min): 9


Apgar Score (5 Min): 10





Discharge Information





- Discharge Information


Condition: Good


Instruction/Follow Up: See Instruction Sheet, Four Weeks, Six Weeks

## 2018-09-15 NOTE — OBPP
PostPartum Progress Note


Assessment/Plan: 


Assessment:


PPD2 s/p  - 24 yo now .





- Ready for home, fu with us 4 and 6 wks.


- A pos, Rubella and VZV immune, GBS neg.





JM


09/15/18 12:57





Subjective/Postpartum Course: 





18 11:34


Pt is doing well today.  She has min cramping controlled with Ibuprofen.  She 

is ambulating and voiding and has min lochia.  Breast feeding is going well and 

baby is good.  


18 19:06


Pt doing well.  Ambulating, voiding, kandis reg diet and breastfeeding without 

difficulty.  Min to mod lochia. Discomfort controlled with po ibuprofen and 

tylenol. 


09/15/18 12:55


Doing great - ready for home, not needing narcs. BF going well.


Objective: 





 





 18 05:55 





 











Patient ABO/Rh  A POSITIVE   18  04:30    








 











Temp Pulse Resp BP Pulse Ox


 


 36.2 C   82   16   102/73   100 


 


 09/15/18 08:00  09/15/18 08:00  09/15/18 08:00  09/15/18 08:00  09/15/18 08:00











 Laboratory Tests











  16





  14:56 10:44 09:54


 


Hct   


 


Rubella IgG Antibody   40.70 


 


VZV IgG Antibody  POSITIVE  


 


Group B Strep DNA    NEGATIVE


 


Patient ABO/Rh   


 


Antibody Screen   














  18





  04:30 04:30 05:55


 


Hct  37.9 L   28.0 L


 


Rubella IgG Antibody   


 


VZV IgG Antibody   


 


Group B Strep DNA   


 


Patient ABO/Rh   A POSITIVE 


 


Antibody Screen   NEGATIVE

## 2018-12-13 ENCOUNTER — HOSPITAL ENCOUNTER (OUTPATIENT)
Dept: HOSPITAL 80 - FED | Age: 23
Setting detail: OBSERVATION
LOS: 1 days | Discharge: HOME | End: 2018-12-14
Attending: SURGERY | Admitting: SURGERY
Payer: MEDICAID

## 2018-12-13 DIAGNOSIS — E86.0: ICD-10-CM

## 2018-12-13 DIAGNOSIS — Z87.891: ICD-10-CM

## 2018-12-13 DIAGNOSIS — Z23: ICD-10-CM

## 2018-12-13 DIAGNOSIS — Z88.2: ICD-10-CM

## 2018-12-13 DIAGNOSIS — Z88.0: ICD-10-CM

## 2018-12-13 DIAGNOSIS — J45.20: ICD-10-CM

## 2018-12-13 DIAGNOSIS — K35.80: Primary | ICD-10-CM

## 2018-12-13 LAB — PLATELET # BLD: 354 10^3/UL (ref 150–400)

## 2018-12-13 PROCEDURE — G0008 ADMIN INFLUENZA VIRUS VAC: HCPCS

## 2018-12-13 PROCEDURE — 0DTJ4ZZ RESECTION OF APPENDIX, PERCUTANEOUS ENDOSCOPIC APPROACH: ICD-10-PCS | Performed by: SURGERY

## 2018-12-13 RX ADMIN — Medication PRN MCG: at 20:48

## 2018-12-13 RX ADMIN — Medication PRN MCG: at 21:30

## 2018-12-13 NOTE — PDGENHP
History & Physical


Chief Complaint: ABDOMINAL PAIN


History of Present Illness: 23-YEAR-OLD FEMALE WITH ABDOMINAL PAIN LOCALIZED TO 

THE RIGHT LOWER QUADRANT TIMES 24 HR.  WBC 61797. ULTRASOUND CONFIRMS ENLARGED 

THICKENED APPENDIX.  SHE HAS HAD NAUSEA AND VOMITING OVERNIGHT AND HAS REQUIRED 

PAIN MEDICINE IN THE ER.  RISKS AND OPTIONS BEEN FULLY DISCUSSED.  ADMIT FOR 

LAP APPY


Pertinent Past, Social, Family History: PAST MEDICAL HISTORY:  /

CHILDBIRTH 3 MONTHS AGO.  REVIEW OF SYSTEMS -10 POINT REVIEW EXCEPT AS RELATED 

TO THE HPI.  SHE DOES NOT SMOKE.  SOCIAL HISTORY NONSMOKER.  FAMILY HISTORY 

POSSIBLE MALIGNANT HYPERTHERMIA IN HER MY LOGIC MOTHER.  ALLERGIES:  PENICILLIN 

AND SUCCINYLCHOLINE POSSIBLY.  MEDICATIONS PRENATAL VITAMINS


Relevant Physical Exam: GENERAL HEALTHY 21 YEAR FEMALE NO ACUTE DISTRESS, 

AFEBRILE.  HEENT NONICTERIC, PERRLA, NO ORAL LESIONS.  NECK SUPPLE AND 

NONTENDER WITH FULL RANGE OF MOTION.  CHEST CLEAR AND SYMMETRIC.  COR REGULAR 

RHYTHM WITHOUT MURMURS.  ABDOMEN SOFT MILDLY DISTENDED, TENDER IN THE RIGHT 

LOWER QUADRANT WITHOUT GUARDING, WITHOUT HERNIAS.  EXTREMITIES FULL RANGE OF 

MOTION FULL PULSES.  NEUROLOGIC PHYSIOLOGIC AND SYMMETRIC.  PSYCH ALERT, 

ORIENTED, COOPERATIVE


Cardiorespiratory Assessment: IMPRESSION:  ACUTE APPENDICITIS.  PLAN LAP APPY/

RISKS AND OPTIONS FULLY DISCUSSED AND SHE WISHED TO PROCEED

## 2018-12-13 NOTE — EDPHY
HPI/HX/ROS/PE/MDM


Narrative: 





CLINICAL IMPRESSION: 


Acute appendicitis 





ASSESSMENT/PLAN:


23-year-old female, 3 months postpartum presents to the emergency department 

with 24 hr of right lower quadrant abdominal pain associated with nausea 

vomiting and diarrhea.  Vital signs stable, afebrile, and declining antiemetic 

and analgesics.  No UTI symptoms or flank pain.  Negative serum pregnancy, 

notable leukocytosis of 15 with left shift, mild anion gap likely secondary to 

mild dehydration otherwise no electrolyte imbalance.  Discussed pelvic 

ultrasound results with Radiology who identifies a positive appendicitis.  

Results discussed with the patient.  She has been NPO since last night.  She is 

currently breast feeding a 3-month-old.  Discussed appropriate antibiotic 

choice of therapy with the ED attending and pharmacy.  Patient reports a 

penicillin allergy which causes hives but has taken cephalosporins in the past.

  However Flagyl is not favorable for breastfeeding and patient was therefore 

given Invanz.  General surgery paged and case discussed with Dr. Sauceda who will 

see the patient in the ED.  Patient continues to decline analgesics.  She was 

stabilized in the ED pending surgical evaluation and operative management..





DIFFERENTIAL DX:


 Abdominal pain includes but not limited to urinary tract infection, 

pyelonephritis,  infection, ectopic pregnancy, salpingitis, TOA, ovarian 

torsion, ovarian cyst, endometriosis, uterine fibroids, acute appendicitis, 

acute diverticulitis, small-bowel obstruction, constipation





ED PROCEDURES:


 See lab and imaging results below 


_________________


ED COURSE:


Labs reviewed, notable for leukocytosis of 15.  Mild anion gap likely secondary 

to mild dehydration.  No other electrolyte imbalance.  Patient receiving IV 

fluids.  Has declined antiemetics and analgesics at this time.  Awaiting 

ultrasound results.


4:20 p.m.:  Ultrasound results discussed with Dr. Modi from Radiology.  Positive 

appendicitis by pelvic ultrasound.  Patient has leukocytosis of 15. She is 

breast feeding.  Page to General surgery.  She has been NPO since last night.  


_________________


CHIEF COMPLAINT: Abdominal pain, nausea vomiting and diarrhea 


_________________


HPI:


This is a 23-year-old female 3 months postpartum who presents to the emergency 

department with approximately 24 hr of right lower quadrant abdominal pain 

associated with nausea, 5 episodes of vomiting, and 1 episode of diarrhea.  

Patient reports no documented fevers or chills.  Pain did awaken her several 

times at night.  She is not currently menstruating and is breast feeding a 3-

month-old.  No reported history of abnormal  findings, ovarian cysts, uterine 

fibroids, and she denies pregnancy.  No UTI symptoms or flank pain.  No 

abnormal vaginal discharge.  She has not had anything to eat today.  No ill 

contacts and her children ate the same food she did last night and are not ill.

  She did not take anything for her symptoms today and she is refusing pain and 

nausea medications.


_________________


PMH:  None reported





Pertinent Past Surgical History:  No prior abdominal surgery





Family History:  Noncontributory





Social History:  Currently breast feeding, nonsmoker


_________________


REVIEW OF SYSTEMS:


All other systems negative


Constitutional:  No fever, no chills, positive appetite change.


ENT:  No sore throat, congestion, ear pain.


Cardiovascular:  No chest pain, no palpitations.


Respiratory:  No cough, no shortness of breath.


Gastrointestinal: Positive for abdominal pain, vomiting, diarrhea.


Genitourinary:  No hematuria, dysuria, flank pain, pelvic pain


Musculoskeletal:  No back pain, joint swelling, joint pain, myalgias.


Skin:  No rashes, color change.


Neurological:  No headache, dizziness, weakness.





_________________


PHYSICAL EXAM:


General Appearance:  Alert, oriented, appropriate, cooperative, NAD, well 

hydrated, non-toxic appearing, VSS, no hypoxia.


HEENT: Oropharynx clear is no erythema or exudates, no tonsillar hypertrophy or 

asymmetry.  Dentition without abnormality.


Neck: Supple, nontender, no lymphadenopathy, no midline pain, FROM, no 

meningismus.


Respiratory:  There are no retractions, lungs are clear to auscultation.


Cardiac:  Regular rate and rhythm, no murmurs or gallops.


Gastrointestinal: Abdomen is soft, right lower quadrant tenderness, bowel 

sounds normal, no masses/hernia, no rigidity, guarding or focal peritoneal 

findings.


Neurological:  Alert and oriented x 3, CN 2-12 grossly intact


Skin: Warm, dry, no rashes, no nodules on palpation.


Musculoskeletal:  Extremities are symmetrical, full range of motion, no 

tenderness, deformity, swelling, or erythema.


Psychiatric:  Patient is oriented X 3, there is no agitation.





_________________


MEDICAL DECISION MAKING:


Patient was seen independently. Secondary supervising physician at time of 

evaluation was Dr. Garcia .


Diagnosis:  Acute appendicitis. New, requires workup


Summary:  See Assessment and Plan for summary of ED visit 


Clinical lab tests:  ordered / reviewed.


Independent visualization of images, tracing, or specimens:  No.


Discussed patient with another provider:  Dr. Modi, Dr. Sauceda, Dr. Garcia





Patient Progress:  Stable. 





- Data Points


Laboratory Results: 


 Laboratory Results





 12/13/18 14:45 





 12/13/18 14:45 





 











  12/13/18 12/13/18 12/13/18





  15:30 14:45 14:45


 


WBC      





    


 


RBC      





    


 


Hgb      





    


 


Hct      





    


 


MCV      





    


 


MCH      





    


 


MCHC      





    


 


RDW      





    


 


Plt Count      





    


 


MPV      





    


 


Neut % (Auto)      





    


 


Lymph % (Auto)      





    


 


Mono % (Auto)      





    


 


Eos % (Auto)      





    


 


Baso % (Auto)      





    


 


Nucleat RBC Rel Count      





    


 


Absolute Neuts (auto)      





    


 


Absolute Lymphs (auto)      





    


 


Absolute Monos (auto)      





    


 


Absolute Eos (auto)      





    


 


Absolute Basos (auto)      





    


 


Absolute Nucleated RBC      





    


 


Immature Gran %      





    


 


Immature Gran #      





    


 


Sodium      142 mEq/L mEq/L





     (135-145) 


 


Potassium      4.2 mEq/L mEq/L





     (3.5-5.2) 


 


Chloride      107 mEq/L mEq/L





     () 


 


Carbon Dioxide      19 mEq/l L mEq/l





     (22-31) 


 


Anion Gap      16 mEq/L H mEq/L





     (6-14) 


 


BUN      15 mg/dL mg/dL





     (7-23) 


 


Creatinine      0.7 mg/dL mg/dL





     (0.6-1.0) 


 


Estimated GFR      > 60 





    


 


Glucose      99 mg/dL mg/dL





     () 


 


Calcium      10.1 mg/dL mg/dL





     (8.5-10.4) 


 


Beta HCG, Qual    NEGATIVE   





    


 


Urine Color  YELLOW     





    


 


Urine Appearance  HAZY     





    


 


Urine pH  5.0     





   (5.0-7.5)   


 


Ur Specific Gravity  1.025     





   (1.002-1.030)   


 


Urine Protein  NEGATIVE     





   (NEGATIVE)   


 


Urine Ketones  1+  H     





   (NEGATIVE)   


 


Urine Blood  1+  H     





   (NEGATIVE)   


 


Urine Nitrate  NEGATIVE     





   (NEGATIVE)   


 


Urine Bilirubin  NEGATIVE     





   (NEGATIVE)   


 


Urine Urobilinogen  NEGATIVE EU EU    





   (0.2-1.0)   


 


Ur Leukocyte Esterase  NEGATIVE     





   (NEGATIVE)   


 


Urine RBC  1-3 /hpf /hpf    





   (0-3)   


 


Urine WBC  1-3 /hpf /hpf    





   (0-3)   


 


Ur Epithelial Cells  TRACE /lpf /lpf    





   (NONE-1+)   


 


Urine Mucus  2+ /lpf H /lpf    





   (NONE-1+)   


 


Urine Glucose  NEGATIVE     





   (NEGATIVE)   














  12/13/18





  14:45


 


WBC  15.84 10^3/uL H 10^3/uL





   (3.80-9.50) 


 


RBC  5.04 10^6/uL 10^6/uL





   (4.18-5.33) 


 


Hgb  14.0 g/dL g/dL





   (12.6-16.3) 


 


Hct  42.1 % %





   (38.0-47.0) 


 


MCV  83.5 fL fL





   (81.5-99.8) 


 


MCH  27.8 pg L pg





   (27.9-34.1) 


 


MCHC  33.3 g/dL g/dL





   (32.4-36.7) 


 


RDW  14.1 % %





   (11.5-15.2) 


 


Plt Count  354 10^3/uL 10^3/uL





   (150-400) 


 


MPV  11.4 fL fL





   (8.7-11.7) 


 


Neut % (Auto)  88.0 % H %





   (39.3-74.2) 


 


Lymph % (Auto)  6.4 % L %





   (15.0-45.0) 


 


Mono % (Auto)  4.7 % %





   (4.5-13.0) 


 


Eos % (Auto)  0.1 % L %





   (0.6-7.6) 


 


Baso % (Auto)  0.4 % %





   (0.3-1.7) 


 


Nucleat RBC Rel Count  0.0 % %





   (0.0-0.2) 


 


Absolute Neuts (auto)  13.94 10^3/uL H 10^3/uL





   (1.70-6.50) 


 


Absolute Lymphs (auto)  1.02 10^3/uL 10^3/uL





   (1.00-3.00) 


 


Absolute Monos (auto)  0.74 10^3/uL 10^3/uL





   (0.30-0.80) 


 


Absolute Eos (auto)  0.01 10^3/uL L 10^3/uL





   (0.03-0.40) 


 


Absolute Basos (auto)  0.07 10^3/uL 10^3/uL





   (0.02-0.10) 


 


Absolute Nucleated RBC  0.00 10^3/uL 10^3/uL





   (0-0.01) 


 


Immature Gran %  0.4 % %





   (0.0-1.1) 


 


Immature Gran #  0.06 10^3/uL 10^3/uL





   (0.00-0.10) 


 


Sodium  





  


 


Potassium  





  


 


Chloride  





  


 


Carbon Dioxide  





  


 


Anion Gap  





  


 


BUN  





  


 


Creatinine  





  


 


Estimated GFR  





  


 


Glucose  





  


 


Calcium  





  


 


Beta HCG, Qual  





  


 


Urine Color  





  


 


Urine Appearance  





  


 


Urine pH  





  


 


Ur Specific Gravity  





  


 


Urine Protein  





  


 


Urine Ketones  





  


 


Urine Blood  





  


 


Urine Nitrate  





  


 


Urine Bilirubin  





  


 


Urine Urobilinogen  





  


 


Ur Leukocyte Esterase  





  


 


Urine RBC  





  


 


Urine WBC  





  


 


Ur Epithelial Cells  





  


 


Urine Mucus  





  


 


Urine Glucose  





  











Medications Given: 


 








Discontinued Medications





Sodium Chloride (Ns)  1,000 mls @ 0 mls/hr IV EDNOW ONE; Wide Open


   PRN Reason: Protocol


   Stop: 12/13/18 15:14


   Last Admin: 12/13/18 15:37 Dose:  1,000 mls








General


Time Seen by Provider: 12/13/18 14:23


Initial Vital Signs: 


 Initial Vital Signs











Temperature (C)  36.7 C   12/13/18 11:58


 


Heart Rate  100   12/13/18 11:58


 


Respiratory Rate  16   12/13/18 11:58


 


Blood Pressure  118/75   12/13/18 11:58


 


O2 Sat (%)  98   12/13/18 11:58








 











O2 Delivery Mode               Room Air














Allergies/Adverse Reactions: 


 





Penicillins Allergy (Verified 12/13/18 11:58)


 


succinylcholine Allergy (Verified 12/13/18 11:58)


 








Home Medications: 














 Medication  Instructions  Recorded


 


Dha  09/08/18


 


Prenatal  09/08/18


 


Ferrous Sulfate [Slow Fe 140  mg PO DAILY  tab.er 09/15/18





(*)]  














Departure





- Departure


Clinical Impression: 


Acute appendicitis


Qualifiers:


 Acute appendicitis type: unspecified acute appendicitis type Qualified Code(s)

: K35.80 - Unspecified acute appendicitis





Condition: Good


Referrals: 


NONE *PRIMARY CARE P,. [Primary Care Provider] - As per Instructions

## 2018-12-13 NOTE — POSTOPPROG
Post Op Note


Date of Operation: 12/13/18


Surgeon: Curtis Sauceda


Anesthesiologist: JORGE ALBERTO


Anesthesia: GET(General Endotracheal)


Pre-op Diagnosis: ACUTE APPENDICITIS


Post-op Diagnosis: SAME


Indication: PAIN


Procedure: LAP APPY


Findings: ACUTE APPENDICITIS NON PERFORATED


Inf/Abcess present in the surg proc area at time of surgery?: Yes


Depth: Organ Space


EBL: Minimal


Complications: 





NONE


Specimen(s): 





APPENDIX

## 2018-12-13 NOTE — PDANEPAE
ANE History of Present Illness


acute appendicitis, here for appendectomy








ANE Past Medical History





- Cardiovascular History


Hx Hypertension: No


Hx Arrhythmias: No


Hx Chest Pain: No


Hx Coronary Artery / Peripheral Vascular Disease: No


Hx CHF / Valvular Disease: No


Hx Palpitations: No





- Pulmonary History


Hx COPD: No


Hx Asthma/Reactive Airway Disease: Yes


Hx Recent Upper Respiratory Infection: No


Hx Oxygen in Use at Home: No


Hx Sleep Apnea: No


Pulmonary History Comment: Asthma- mild, intermittant





- Neurologic History


Hx Cerebrovascular Accident: No


Hx Seizures: No


Hx Dementia: No





- Endocrine History


Hx Diabetes: No





- Renal History


Hx Renal Disorders: No





- Liver History


Hx Hepatic Disorders: No





- Neurological & Psychiatric Hx


Hx Neurological and Psychiatric Disorders: No





- Cancer History


Hx Cancer: No





- Congenital Disorder History


Hx Congenital Disorders: No





- GI History


Hx Gastrointestinal Disorders: No





- Chronic Pain History


Chronic Pain: No





- Surgical History


Prior Surgeries: NA





ANE Review of Systems


Review of Systems: 








- Exercise capacity


METS (RN): 4 METS





ANE Patient History





- Allergies


Allergies/Adverse Reactions: 








Penicillins Allergy (Verified 12/13/18 11:58)


 


succinylcholine Allergy (Verified 12/13/18 11:58)


 








- Home Medications


Home Medications: 








Prenatal Vits96/Iron Fum/Folic [Prenatal Tablet] 1 tab PO DAILY #0 09/08/18 [

Last Taken 12/12/18 08:00]


Ferrous Sulfate [Ferrous Sulf 325 MG (*)] 325 mg PO DAILY 12/13/18 [Last Taken 

12/12/18 08:00]








- NPO status


NPO Since - Liquids (Date): 12/13/18


NPO Since - Liquids (Time): 02:00


NPO Since - Solids (Date): 12/12/18


NPO Since - Solids (Time): 18:30





- Anes Hx


Hx Anesthesia Complications (with details): never had anesthesia





- Smoking Hx


Smoking Status: Former smoker





- Alcohol Use


Alcohol Use: Rarely





- Family Anes Hx


Family Hx Anesthesia Complications: Malignant hyperthermia biological mother





ANE Labs/Vital Signs





- Labs


Result Diagrams: 


 12/13/18 14:45





 12/13/18 14:45





- Vital Signs


Blood Pressure: 108/67


Heart Rate: 81


Respiratory Rate: 16


O2 Sat (%): 98


Height: 157.48 cm


Weight: 61.689 kg





ANE Physical Exam





- Airway


Neck exam: FROM


Mallampati Score: Class 2


Mouth exam: normal dental/mouth exam





- Pulmonary


Pulmonary: no respiratory distress, clear to auscultation





- Cardiovascular


Cardiovascular: regular rate and rhythym, no murmur, rub, or gallop





- ASA Status


ASA Status: II





ANE Anesthesia Plan


Anesthesia Plan: general endotracheal anesthesia (non- MH triggering anesthetic)


Total IV Anesthesia: Yes

## 2018-12-14 VITALS — DIASTOLIC BLOOD PRESSURE: 65 MMHG | SYSTOLIC BLOOD PRESSURE: 105 MMHG

## 2018-12-14 RX ADMIN — OXYCODONE HYDROCHLORIDE AND ACETAMINOPHEN PRN TAB: 5; 325 TABLET ORAL at 11:33

## 2018-12-14 RX ADMIN — OXYCODONE HYDROCHLORIDE AND ACETAMINOPHEN PRN TAB: 5; 325 TABLET ORAL at 07:20

## 2018-12-14 NOTE — SOAPPROG
SOAP Progress Note


Assessment/Plan: 


Assessment:





22 y/o F s/p lap appy





S: No complaints.  Tolerating a regular diet and passing gas.  Pain controlled.





O: Alert


Afebrile


RRR


No increased WOB


Abdomen: soft, mildly ttp, nondistended, +BS





Plan: D/c home today.





18 12:20





Objective: 





 Vital Signs











Temp Pulse Resp BP Pulse Ox


 


 36.2 C   73   16   105/65   97 


 


 18 07:33  18 07:33  18 07:33  18 07:33  18 07:33








 











 12/13/18 12/14/18 12/15/18





 05:59 05:59 05:59


 


Intake Total   


 


Output Total  555 150


 


Balance  1455 -150














ICD10 Worksheet


Patient Problems: 


 Problems











Problem Status Onset


 


Acute appendicitis Acute  


 


Placental abruption Acute  


 


 (spontaneous vaginal delivery) Acute

## 2018-12-15 NOTE — GOP
DATE OF OPERATION:  12/13/2018



SURGEON:  Curtis Sauceda MD



ASSISTANT:  There was no assistant.



ANESTHESIOLOGIST:  Dr. Samuel Kimbrough.



PREOPERATIVE DIAGNOSIS:  Acute appendicitis.



POSTOPERATIVE DIAGNOSIS:  Acute appendicitis.



PROCEDURE PERFORMED:  Laparoscopic appendectomy.



FINDINGS:  Patient was found to have an acute suppurative, nonperforated appendicitis.





ESTIMATED BLOOD LOSS:  Negligible.



DESCRIPTION OF PROCEDURE:  The patient was taken operating room where she received a satisfactory gen
eral endotracheal anesthesia.  She was placed in supine position, prepped and draped in the usual cathie
rile fashion.  A periumbilical incision was made.  A Veress needle inserted.  Pneumoperitoneum was es
tablished.  Trocar was introduced.  Laparoscope introduced.  Good visualization was obtained.  Two ot
her trocars introduced, under direct vision in the lower abdomen.  The appendix was elevated up.  The
 mesoappendix was divided with the Harmonic Scalpel until the base was skeletonized.  The appendix wa
s then divided with the Endo-BRIGETTE stapler, placed in a specimen bag and extracted through the upper mi
dline port site.  Hemostasis was assured.  The wound was then closed using 0 Vicryl for the fascia an
d 4-0 Monocryl subcuticular stitch for the skin.  All layers infiltrated with 0.5% Marcaine.



COMPLICATIONS:  No complications.





Job #:  [id number]